# Patient Record
(demographics unavailable — no encounter records)

---

## 2025-05-12 NOTE — PROCEDURE
[de-identified] : Stroboscopic Laryngoscopy Procedure Note:  Indication:	Assess laryngeal biomechanics and vocal fold oscillation.  Description of Procedure:	Informed consent was verbally obtained from the patient prior to the procedure. The patient was seated in the clinic chair. Topical anesthesia was achieved by first spraying the nasal cavities with 4% lidocaine and nasal decongestant.   Findings:  Supraglottis: no masses or lesions  Glottis:    Structure:                        Right: crisp and shows no lesions or masses                        Left:  crisp and shows no lesions or masses                 Mobility:                        Right:  normal                        Left:  absent, midline                Amplitude:                        Right:  normal                       Left:  normal                Closure: complete                 Wave symmetry:  symmetric  Subglottis: no masses or lesions within the visualized subglottis Visualized airway is widely patent. Trachea: upper tracheal stenosis around trach tube Base of tongue: mild vascular malformation   Tracheoscopy shows trachea well moistened.

## 2025-05-12 NOTE — HISTORY OF PRESENT ILLNESS
[de-identified] : MARILYN DÍAZ is a 86 year old woman who presents with her daughter to the [facility] for evaluation of possible vocal cord dysfunction. Hx of alzheimers.    She is referred by the rehab center.   She has a history of respiratory failure after having aortic dissection March 2024, requiring trach/vent in April 2024.  Post surgery, was in [place] ICU for about 3 months. Reintubation about 3 times during stay.  Currently 3LNC on vent. Daughter reports can tolerate up to 12 hours without oxygen.  Transferred to [place] ICU for respiratory failure over the summer, then transferred to [facility] rehab in the fall. This past December hospitalized for pneumonia where had another intubation and swelling.  Trach recently changed in past 2-3 weeks ago in [place] -- believes wearing Shiley, inner cannula 6.  Prior to recent trach change had bright red bloody drainage, now resolved.    She has noted breathing problems for the past 1 year.  Followed up with SLP in rehab where had feed but believed had aspiration pneumonia in December. FESS done 01/14/25.  She is currently NPO with g tube feeding.  The patient does note specific triggers and these usually include(s) anxiety.  She notes difficulty swallowing frequently.  She does not note symptoms of reflux including heartburn, acidic taste in the throat, and/or belching. She has not used inhalers for these events. Using nebulizer treatments.

## 2025-05-12 NOTE — HISTORY OF PRESENT ILLNESS
[de-identified] : MARILYN DÍAZ is a 86 year old woman who presents with her daughter to the [facility] for evaluation of possible vocal cord dysfunction. Hx of alzheimers.    She is referred by the rehab center.   She has a history of respiratory failure after having aortic dissection March 2024, requiring trach/vent in April 2024.  Post surgery, was in [place] ICU for about 3 months. Reintubation about 3 times during stay.  Currently 3LNC on vent. Daughter reports can tolerate up to 12 hours without oxygen.  Transferred to [place] ICU for respiratory failure over the summer, then transferred to [facility] rehab in the fall. This past December hospitalized for pneumonia where had another intubation and swelling.  Trach recently changed in past 2-3 weeks ago in [place] -- believes wearing Shiley, inner cannula 6.  Prior to recent trach change had bright red bloody drainage, now resolved.    She has noted breathing problems for the past 1 year.  Followed up with SLP in rehab where had feed but believed had aspiration pneumonia in December. FESS done 01/14/25.  She is currently NPO with g tube feeding.  The patient does note specific triggers and these usually include(s) anxiety.  She notes difficulty swallowing frequently.  She does not note symptoms of reflux including heartburn, acidic taste in the throat, and/or belching. She has not used inhalers for these events. Using nebulizer treatments.

## 2025-05-12 NOTE — PHYSICAL EXAM
[Normal] : mucosa is normal [Midline] : trachea located in midline position [de-identified] : trach faceplate in good position [de-identified] : cerumen impaction

## 2025-05-12 NOTE — ASSESSMENT
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  Will likely need CT neck in future to further eval trach stenosis.  I have recommended we get an MBS and I follow up with her afterwards.  Current trach is 6 cuffed Shiley.  Her facility should start using mineral oil 4 drops each ear before bed to loosen her cerumen impactions which I will then suction clean at next visit and then get audio.   Total time: 45 minutes; total time does not include time spent performing the procedure and its related elements. It does include all other face to face and non face to face pre and post visit tasks performed on the same date of service.

## 2025-05-12 NOTE — PROCEDURE
[de-identified] : Stroboscopic Laryngoscopy Procedure Note:  Indication:	Assess laryngeal biomechanics and vocal fold oscillation.  Description of Procedure:	Informed consent was verbally obtained from the patient prior to the procedure. The patient was seated in the clinic chair. Topical anesthesia was achieved by first spraying the nasal cavities with 4% lidocaine and nasal decongestant.   Findings:  Supraglottis: no masses or lesions  Glottis:    Structure:                        Right: crisp and shows no lesions or masses                        Left:  crisp and shows no lesions or masses                 Mobility:                        Right:  normal                        Left:  absent, midline                Amplitude:                        Right:  normal                       Left:  normal                Closure: complete                 Wave symmetry:  symmetric  Subglottis: no masses or lesions within the visualized subglottis Visualized airway is widely patent. Trachea: upper tracheal stenosis around trach tube Base of tongue: mild vascular malformation   Tracheoscopy shows trachea well moistened.

## 2025-05-12 NOTE — PHYSICAL EXAM
[Normal] : mucosa is normal [Midline] : trachea located in midline position [de-identified] : trach faceplate in good position [de-identified] : cerumen impaction

## 2025-06-18 NOTE — HISTORY OF PRESENT ILLNESS
[de-identified] : MARILYN DÍAZ is a 86 year old woman who presents with her daughter to the [facility] for evaluation of left vocal fold immobility, dysphagia, trach dependent, trach stenosis, and vent dependent. Last seen 5/12/25. They were to bring a new trach to try and scope from below at next visit. Will consider CT neck in future to evaluate stenosis. She was to begin getting mineral oil drops, and we would attempt to suction out the rest of her cerumen impaction and get audio. She was to follow up after MBS, but MBS is scheduled for 6/27.  Previously reported: possible vocal cord dysfunction. Hx of alzheimers.  She is referred by the rehab center. She has a history of respiratory failure after having aortic dissection March 2024, requiring trach/vent in April 2024. Post surgery, was in [place] ICU for about 3 months. Reintubation about 3 times during stay. Currently 3LNC on vent. Daughter reports can tolerate up to 12 hours without oxygen. Transferred to [place] ICU for respiratory failure over the summer, then transferred to [facility] rehab in the fall. This past December hospitalized for pneumonia where had another intubation and swelling. Trach recently changed in past 2-3 weeks ago in [place] -- believes wearing Shiley, inner cannula 6. Prior to recent trach change had bright red bloody drainage, now resolved. She has noted breathing problems for the past 1 year. Followed up with SLP in rehab where had feed but believed had aspiration pneumonia in December. FESS done 01/14/25. She is currently NPO with g tube feeding. The patient does note specific triggers and these usually include(s) anxiety. She notes difficulty swallowing frequently. She does not note symptoms of reflux including heartburn, acidic taste in the throat, and/or belching. She has not used inhalers for these events. Using nebulizer treatments.

## 2025-06-25 NOTE — REASON FOR VISIT
[Videofluroscopy] : Videofluroscopy [FreeTextEntry1] : Patient's chart reviewed and order received. The patient was received today for OP MBS. Patient arrived to [facility] with her private HHA and two Paramedics. Patient with +PEG, +tracheostomy (Shiley size: 7.5 cuff inflated), and +ventilator (PEEP: 5, FiO2: 40%, Full vent AC). Patient was awake, alert, and in NAD. SpO2 97-98% throughout encounter. Mildly increased WOB appreciated. Per private HHA, patient has been "anxious" since transport to [facility] for today's study and that she is more "confused" today. The patient stated that her breathing felt "heavy." She was communicative and in NAD. Wait time provided; however, patient presentation remained the same. Discussed with referring MD Dr. Khan and patient's daughter at which time it was determined to defer today's study, for patient to return back to [facility], and to reschedule today's study. Nursing Supervisor Lizzie was notified of the aforementioned information at [facility] care. Patient was left as received with her private HHA and the Paramedics for transport back to her facility. MBS was not completed. MBS rescheduled for 8/7/25. Referring MD, Dr. Khan notified.